# Patient Record
Sex: FEMALE | Race: BLACK OR AFRICAN AMERICAN | Employment: OTHER | URBAN - METROPOLITAN AREA
[De-identification: names, ages, dates, MRNs, and addresses within clinical notes are randomized per-mention and may not be internally consistent; named-entity substitution may affect disease eponyms.]

---

## 2018-12-18 ENCOUNTER — HOSPITAL ENCOUNTER (OUTPATIENT)
Dept: CARDIAC CATH/INVASIVE PROCEDURES | Age: 73
Discharge: HOME | End: 2018-12-18
Attending: INTERNAL MEDICINE | Admitting: INTERNAL MEDICINE
Payer: MEDICARE

## 2018-12-18 VITALS
OXYGEN SATURATION: 100 % | BODY MASS INDEX: 16.38 KG/M2 | HEIGHT: 62 IN | HEART RATE: 77 BPM | RESPIRATION RATE: 18 BRPM | DIASTOLIC BLOOD PRESSURE: 71 MMHG | SYSTOLIC BLOOD PRESSURE: 166 MMHG | WEIGHT: 89 LBS

## 2018-12-18 PROCEDURE — 77030013406 HC CATH CTRL EDWD -B

## 2018-12-18 PROCEDURE — C1751 CATH, INF, PER/CENT/MIDLINE: HCPCS

## 2018-12-18 PROCEDURE — 77030013744

## 2018-12-18 PROCEDURE — 74011250636 HC RX REV CODE- 250/636: Performed by: INTERNAL MEDICINE

## 2018-12-18 PROCEDURE — 93463 DRUG ADMIN & HEMODYNMIC MEAS: CPT

## 2018-12-18 PROCEDURE — C1769 GUIDE WIRE: HCPCS

## 2018-12-18 PROCEDURE — 99152 MOD SED SAME PHYS/QHP 5/>YRS: CPT

## 2018-12-18 RX ORDER — ACETAMINOPHEN/DIPHENHYDRAMINE 500MG-25MG
1 TABLET ORAL
COMMUNITY

## 2018-12-18 RX ORDER — SODIUM CHLORIDE 0.9 % (FLUSH) 0.9 %
5-10 SYRINGE (ML) INJECTION AS NEEDED
Status: DISCONTINUED | OUTPATIENT
Start: 2018-12-18 | End: 2018-12-18

## 2018-12-18 RX ORDER — ASPIRIN 81 MG/1
81 TABLET ORAL DAILY
COMMUNITY

## 2018-12-18 RX ORDER — FENTANYL CITRATE 50 UG/ML
25-200 INJECTION, SOLUTION INTRAMUSCULAR; INTRAVENOUS
Status: DISCONTINUED | OUTPATIENT
Start: 2018-12-18 | End: 2018-12-18

## 2018-12-18 RX ORDER — FENOFIBRATE 48 MG/1
48 TABLET, COATED ORAL DAILY
COMMUNITY

## 2018-12-18 RX ORDER — MIDAZOLAM HYDROCHLORIDE 1 MG/ML
.5-1 INJECTION, SOLUTION INTRAMUSCULAR; INTRAVENOUS
Status: DISCONTINUED | OUTPATIENT
Start: 2018-12-18 | End: 2018-12-18

## 2018-12-18 RX ORDER — ATROPINE SULFATE 0.1 MG/ML
.5-1 INJECTION INTRAVENOUS AS NEEDED
Status: DISCONTINUED | OUTPATIENT
Start: 2018-12-18 | End: 2018-12-18

## 2018-12-18 RX ORDER — HEPARIN SODIUM 200 [USP'U]/100ML
2000 INJECTION, SOLUTION INTRAVENOUS AS NEEDED
Status: DISCONTINUED | OUTPATIENT
Start: 2018-12-18 | End: 2018-12-18

## 2018-12-18 RX ORDER — LORATADINE 10 MG/1
10 TABLET ORAL DAILY
COMMUNITY

## 2018-12-18 RX ORDER — ROSUVASTATIN CALCIUM 10 MG/1
10 TABLET, COATED ORAL
COMMUNITY

## 2018-12-18 RX ORDER — LIDOCAINE HYDROCHLORIDE 10 MG/ML
10-30 INJECTION INFILTRATION; PERINEURAL
Status: DISCONTINUED | OUTPATIENT
Start: 2018-12-18 | End: 2018-12-18

## 2018-12-18 RX ORDER — POTASSIUM CHLORIDE 750 MG/1
20 TABLET, EXTENDED RELEASE ORAL DAILY
COMMUNITY

## 2018-12-18 RX ORDER — METOPROLOL TARTRATE 50 MG/1
50 TABLET ORAL 2 TIMES DAILY
COMMUNITY

## 2018-12-18 RX ORDER — BUMETANIDE 2 MG/1
2 TABLET ORAL 2 TIMES DAILY
COMMUNITY

## 2018-12-18 RX ORDER — SODIUM CHLORIDE 0.9 % (FLUSH) 0.9 %
5-10 SYRINGE (ML) INJECTION EVERY 8 HOURS
Status: DISCONTINUED | OUTPATIENT
Start: 2018-12-18 | End: 2018-12-18 | Stop reason: HOSPADM

## 2018-12-18 RX ORDER — SODIUM CHLORIDE 9 MG/ML
25-100 INJECTION, SOLUTION INTRAVENOUS CONTINUOUS
Status: DISCONTINUED | OUTPATIENT
Start: 2018-12-18 | End: 2018-12-18 | Stop reason: HOSPADM

## 2018-12-18 RX ORDER — ISOSORBIDE DINITRATE 30 MG/1
30 TABLET ORAL DAILY
COMMUNITY

## 2018-12-18 RX ORDER — METOLAZONE 2.5 MG/1
2.5 TABLET ORAL EVERY OTHER DAY
COMMUNITY

## 2018-12-18 RX ADMIN — MIDAZOLAM 1 MG: 1 INJECTION INTRAMUSCULAR; INTRAVENOUS at 12:05

## 2018-12-18 RX ADMIN — FENTANYL CITRATE 25 MCG: 50 INJECTION, SOLUTION INTRAMUSCULAR; INTRAVENOUS at 12:05

## 2018-12-18 RX ADMIN — LIDOCAINE HYDROCHLORIDE 3 ML: 10 INJECTION, SOLUTION INFILTRATION; PERINEURAL at 12:22

## 2018-12-18 RX ADMIN — HEPARIN SODIUM 2000 UNITS: 200 INJECTION, SOLUTION INTRAVENOUS at 12:14

## 2018-12-18 RX ADMIN — MIDAZOLAM 1 MG: 1 INJECTION INTRAMUSCULAR; INTRAVENOUS at 12:21

## 2018-12-18 RX ADMIN — SODIUM CHLORIDE 25 ML/HR: 900 INJECTION, SOLUTION INTRAVENOUS at 11:58

## 2018-12-18 RX ADMIN — MIDAZOLAM 1 MG: 1 INJECTION INTRAMUSCULAR; INTRAVENOUS at 12:22

## 2018-12-18 NOTE — PROGRESS NOTES
1157  Cardiac Cath Lab Procedure Area Arrival Note:    Neal Jewell arrived to Cardiac Cath Lab, Procedure Area. Patient identifiers verified with NAME and DATE OF BIRTH. Procedure verified with patient. Consent forms verified. Allergies verified. Patient informed of procedure and plan of care. Questions answered with review. Patient voiced understanding of procedure and plan of care. Patient on cardiac monitor, non-invasive blood pressure, SPO2 monitor. On O2 @ 3 lpm via NC.  IV of NS on pump at 25 ml/hr. Patient status doing well without problems. Patient is A&Ox 4. Patient reports no pain. Patient medicated during procedure with orders obtained and verified by Dr. Yuli Sherman. Refer to patients Cardiac Cath Lab PROCEDURE REPORT for vital signs, assessment, status, and response during procedure, printed at end of case. Printed report on chart or scanned into chart. 1315:   TRANSFER - OUT REPORT:    Verbal report given to LUIS Wang RN(name) on Neal Jewell  being transferred to (unit) for routine post - op       Report consisted of patients Situation, Background, Assessment and   Recommendations(SBAR). Information from the following report(s) SBAR, Procedure Summary and MAR was reviewed with the receiving nurse. Lines:   Peripheral IV 12/18/18 Right (Active)   Site Assessment Clean, dry, & intact 12/18/2018 11:58 AM   Phlebitis Assessment 0 12/18/2018 11:58 AM   Infiltration Assessment 0 12/18/2018 11:58 AM   Dressing Status Clean, dry, & intact 12/18/2018 11:58 AM   Dressing Type Transparent 12/18/2018 11:58 AM   Hub Color/Line Status Blue 12/18/2018 11:58 AM   Alcohol Cap Used Yes 12/18/2018 11:58 AM        Opportunity for questions and clarification was provided.       Patient transported with:   Registered Nurse

## 2018-12-18 NOTE — H&P
History and Physical as documented in office note from 2018  No interval changes    MD Szuy Zaragoza  2018 7:43 AM  Location: [address] [place] [address] Office  Patient #: [id number]  : 1945   / Language: English / Race: [place] or African American  Female      History of Present Illness (Isabela Mckeon; 2018 5:20 PM)  The patient is a 74year old female who presents with pulmonary hypertension. The patient presents for initial presentation of symptoms. Symptoms include dyspnea on exertion. Onset was gradual month(s) ago. The patient describes this as mild and unchanged. Symptoms are exacerbated by exertion. Associated symptoms do not include fever. By report there is good compliance with treatment. Additional reasons for visit:    Transition into care is described as the following: The patient is transitioning into care from another physician. Note: Patient is here for evaluation of pulmonary hypertension. She has a history of long-standing COPD with FEV1 measured at 0.72 L, on home oxygen long-term who has had trouble with exertional breathlessness. Echocardiogram was done during one of her hospitalizations and showed markedly elevated right-sided pressures with severe dilatation of right ventricle and dysfunction. Initial workup has been initiated with serologies pending. Lung VQ scan is pending as well. She reports functional limitation and has been found to be at [facility] functional class III. She underwent cardiac catheterization and coronary angiograms in the past that found severe coronary artery disease that is now medically managed. LVEDP was within normal range during cardiac catheterization studies judged to be between 8 and 10 mmHg. Patient may benefit from right heart catheterization for confirmation of severe precapilalry pulmonary hypertension.  If filling pressures are again within normal range, perhaps cautious trial of off label pulmonary vasodilator therapy could be considered for out of proportion precapillary pulmonary hypertension not explained by COPD. She appears to be compliant with continuous oxygen and this has been advised. Diagnosis of pulmonary hypertension, requirements and need for further workup was discussed with her and her  today. Right heart catheterization will be scheduled at [address] in the near future    Problem List/Past Medical Karsten Mary Carmenbryant; 11/29/2018 3:11 PM)  DEPENDENCE ON OXYGEN (V46.2)    PNEUMONIA (J18.9)    RHINITIS, NONALLERGIC (J31.0)    DYSPNEA (R06.00)    GRANULOMATOUS DISEASE, CHRONIC (D71)   h/o histoplasmosis. HYPOXIA (R09.02)    ABNORMAL LUNG SOUNDS (R09.89)    LOW DOSE CT SCAN ORDER WITH HISTORY OF TOBACCO USE Z87.891 (Z87.891)    FLU VACCINE RECEIVED PRIOR TO TODAY'S VISIT (Z92.29)    CHRONIC RESPIRATORY FAILURE WITH HYPOXIA (J96.11)    SEVERE CHRONIC OBSTRUCTIVE PULMONARY DISEASE (J44.9)   FEV1 0.72L (47%)  PULMONARY HYPERTENSION (I27.20)   Severely decompensated RV failure without left sided issues. Does have COPD but severity oh PH seems to be out of proportion to that.  -obtain CTD labs, VQ scan, HIV  -referal to Dr. Nicanor Andre for further eval with [facility]  TOBACCO USE (Z72.0)   Lifelong smoker- currently 1/2 ppd. CARDIOMYOPATHY (I42.9)   dHF and cor pulmonale  EXERCISE-INDUCED SHORTNESS OF BREATH (R06.02)    ENVIRONMENTAL ALLERGIES (Z91.09)    PHYSICAL DECONDITIONING (R53.81)    CORONARY ARTERY DISEASE (I25.10)   medically managed    Other Problems Karsten Izaiah; 11/29/2018 3:11 PM)  COPD   .  Pneumonia   .  Bronchitis   .  High blood pressure   .  Kidney Disease   . No Advanced Medical Directive   .  Unspecified Diagnosis    Unspecified Diagnosis      Allergies (Nitza Bliss; 11/29/2018 3:11 PM)  No Known Drug Allergies  [02/16/2016]: Family History Nitza lBiss; 11/29/2018 3:11 PM)  Hypertension   Mother. .  Arthritis   Mother. .  Heart Disease   Mother.  .    Social History Nitza Bliss; 11/29/2018 3:11 PM)  No alcohol use   .  Exercise   Inactive. .  Tobacco use   Current every day smoker. Smokes < 1 pack of cigarettes per day . No drug use   .  Pets/Animals in home   None. Medication History Keyla Barnhart; 11/29/2018 3:11 PM)  ProAir HFA  (108 (90 Base)MCG/ACT Aerosol Soln, 2 (two) Inhalation every four hours as needed, Taken starting 11/16/2018) Active. Spiriva Respimat  (2.5MCG/ACT Aerosol Soln, 2 (two) Inhalation daily, Taken starting 11/16/2018) Active. Fluticasone Propionate  (50MCG/ACT Suspension, 2 (two) Nasal daily, Taken starting 10/15/2018) Active. Dulera  (200-5MCG/ACT Aerosol, 2 (two) Inhalation two times daily, Taken starting 11/16/2018) Active. Mucinex  (600MG Tablet ER 12HR, 2 (two) Oral two times daily, Taken starting 10/15/2018) Active. (generic is fine)  Albuterol Sulfate  ((2.5 MG/3ML)0.083% Nebulized Soln, 1 (one) Inhalation every four hours, as needed, Taken starting 11/16/2018) Active. (please run under medicare part B)  Loratadine  (10MG Capsule, 1 (one) Oral daily, Taken starting 10/15/2018) Active. Aspirin  (81MG Tablet, 1 Oral daily) Active. Imdur  (30MG Tablet ER 24HR, 1 Oral daily) Active. MetOLazone  (2.5MG Tablet, 1 Oral daily) Active. Potassium Chloride  [facility] Tablet ER, 2 Oral daily) Active. Metoprolol Tartrate  (25MG Tablet, 1 Oral two times daily) Active. Fenofibrate  (48MG Tablet, 1 Oral daily) Active. Medications Reconciled     Past Surgical History Keyla Barnhart; 11/29/2018 3:11 PM)  Hysterectomy   .        Review of Systems (Joey Cook; 11/29/2018 3:01 PM)  General Not Present- Fever, Food allergies, Medication allergies, Night Sweats, Seasonal allergies, Weight Gain and Weight Loss. Skin Not Present- Cyanosis, Jaundice and Rash. HEENT Present- Sinusitis.  Not Present- Blurred Vision, Deafness, Difficulty swallowing, Double Vision, Ear Discharge, Ear Pain, Eye discharge, Hoarseness, Nasal obstruction, Nose Bleed, Ringing in the Ears, Sore Throat and Wears glasses/contact lenses. Respiratory Present- Daytime sleepiness and Shortness of breath. Not Present- Cough, Coughing blood, Snoring and Wheezing. Cardiovascular Not Present- Chest Pain and Palpitations. Gastrointestinal Present- Constipation. Not Present- Abdominal Pain, Diarrhea, Indigestion, Nausea, Rectal Bleeding and Vomiting. Female Genitourinary Not Present- Blood in Urine, Frequency, Incontinence, Painful Urination and Urinating at Night. Musculoskeletal Present- Swelling of Extremities. Not Present- Joint Pain, Joint Stiffness and Joint Swelling. Neurological Present- Headaches. Not Present- Numbness and Weakness. Psychiatric Not Present- Anxiety, Depression and Hallucinations. Endocrine Present- Excessive Thirst. Not Present- Cold Intolerance and Heat Intolerance. Hematology Not Present- Abnormal Bleeding, Easy Bruising and Swollen glands. Vitals Alexander Carrillo; 11/29/2018 3:13 PM)  11/29/2018 3:11 PM  Weight: 91 lb   Height: 62 in   Body Surface Area: 1.37 m²   Body Mass Index: 16.64 kg/m²    Temp.: 98.8° F    Pulse: 78 (Regular)    P. OX: 99% (Room air)  BP: 130/72 (Sitting, Left Arm, Standard)              Physical Exam (March Burow; 12/1/2018 5:19 PM)  Chest and Lung Exam  Constitutional - alert, cooperative, well nourished and no acute distress. Skin - normal moisture and normal warmth. Head and Neck - normocephalic, atraumatic, trachea midline and thyroid normal.  Eyes - pupils equal round and reactive to light, conjunctiva normal and lids normal.  ENMT - external ears have normal appearance, external nose is normal, oral mucosa is moist and oropharynx clear. Chest and Lungs  Auscultation - breath sounds normal, no wheezes, no rhonchi and no rales. Palpation - chest wall non-tender. Inspection - normal shape and normal respiratory effort. Cardiovascular  Auscultation - rate regular and systolic ejection murmur at left sternal border.  Inspection - jugular veins nondistended and no lower extremity edema. Abdomen - normal contour, nontender, soft and no hepatosplenomegaly. Peripheral Vascular - normal gait and no digital clubbing. Neuropsychiatric - oriented x 3, fluent, appropriate mood and affect and good insight. Lymphatic - no neck lymphadenopathy and no supraclavicular lymphadenopathy. Neurological - extraocular muscles intact. Assessment & Plan (Ardis Lennox; 11/29/2018 3:40 PM)  PULMONARY HYPERTENSION (I27.20) <HCC85>  Story: Severely decompensated RV failure without left sided issues. Does have COPD but severity oh PH seems to be out of proportion to that.  -obtain CTD labs, VQ scan, HIV  -referal to Dr. Bob Baumgarten for further eval with [address]  Current Plans  Pt Education - How to access health information online: discussed with patient and provided information. Thank you for your referral. Please see my notes for my impression and treatment plans. I have instructed the patient to follow-up with you as requested. Pt Education - Eating Better - Dietary Guidance on Salt and Fluid Restrictions: discussed with patient and provided information. Right Heart Cath: Instructions  The test will be performed in the Cath Lab at [facility]: [facility], [address]  Please review all of the instructions about your procedure as soon as you get them, so you can prepare. You may need to change some of your medicines  Arrange to have an adult to drive you to your procedure and be there to take you home after the procedure. If you are taking a cab, bus or medical transportation service home after the procedure, an adult, other than the , needs to ride with you for your safety  Report to the hospital for admission 90 mins before your procedure time to allow time for preparation for this procedure  Start clear liquids at midnight before procedure. You may eat your regular meals until midnight before you procedure.  From midnight, drink only clear liquids until 3 hours before your test.  * These clear liquids are allowed:  Water.  Fruit juices that you can see through, such as apple, white cranberry or white grape.  Popsicles or ice chips.  Ginger ale or lemon-lime soda.  Jell-o.  Coffee or tea (no milk or cream added). *If your procedure is scheduled after 10 AM, you can eat a light breakfast before 6 AM of a piece of toast or a small bowl of cereal with milk. After that, only clear liquids until 3 hours before your procedure  Morning of your procedure   Take your medicines for blood pressure, heart issues, seizures or pain with a sip of water up to 2 hours before your procedure as directed.   Female patients should come prepared to give a urine sample in case a pregnancy test is needed.                Your  must be present when you sign in to proceed with your test.      Bring these with you to your test:                All of your medicines in their prescription bottles that you are scheduled to take during the day.   A list of all medicines, including prescription and over the counter medicines and any vitamin or herbal products you are taking.   A list of your allergies. List of medical conditions and previous surgeries.                                A copy of advanced directive, such as a Living Will or [address]. Your photo identification, insurance card and co-payment, if needed.   CPAP machine if you use one at home for sleep apnea.   Overnight bag packed with personal items and toiletries, in case you need to stay at the hospital. Leave this in the car until you know if you will need to stay. If you have any questions or need to cancel or change your appointment, call [facility] at [phone] and [facility] cath lab at [phone]    Pt Education - Right Heart Catherization information about the procedure: discussed with patient and provided information.   Pt Education - What is Pulmonary Hypertension? : discussed with patient and provided information. SIMPLE CARDIO PULMONARY STRESS TEST (ATS), TO BE PERFORMED BY PFT TECH (37658)  CATHETERIZATION OF RIGHT HEART: RIGHT HEART CATH (86865) ([facility]: try to schedule on 12/18/2018)  Follow up in 4 weeks or as needed  TOBACCO USE (Z72.0)  Story: Lifelong smoker- currently 1/2 ppd.   CHRONIC RESPIRATORY FAILURE WITH HYPOXIA (J96.11) <HCC84>  CORONARY ARTERY DISEASE (I25.10)  Story: medically managed  CARDIOMYOPATHY (I42.9) <HCC85>  Story: dHF and cor pulmonale  NICOTINE DEPENDENCE WITH OTHER NICOTINE-INDUCED DISORDER (F17.208)  Current Plans  Pt Education - Smoking: ways to quit  EDUCATION ABOUT AVOIDANCE OF SMOKING ()  ASSESSMENT FOR TOBACCO USE (1000F)  Pt Education - Smoking: ways to quit  Note: You have pulmonary hypertension of Group 2 and 3 category  We will need a heart catheterization to see if we can safely use medicines to treat this  Try to quit smoking by using the nicotine patch 14 mg daily (OTC)    Medical Decision Making (Pro[employer] Interactive Manual; 12/1/2018 5:23 PM)  Amount/complexity of data to be reviewed:  - Order and/or review of radiology test(s)  - Order and/or review of diagnostic test(s)  - Independent visualization of radiology test  - Review and summarization of old records  - Discussion of case with another health care provider      Immunization Record Kaitlynn Aram; 11/29/2018 3:11 PM)      Immunization Type  Immunization Order  Date  Medication  Funding  Comment  Lisy FlexMinder (3 years and up)  Lisy FlexMinder (3 years and up) ([phone]            Influenza (3 years and up)  Lisy FlexMinder (3 years and up) [phone]            Influenza (3 years and up)  Lisy FlexMinder (3 years and up) [phone]               Signed by [employer] Interactive Manual (12/1/2018 5:23 PM)

## 2018-12-18 NOTE — PROGRESS NOTES
TRANSFER - IN REPORT:    Verbal report received from [facility] on Ishmael James  being received from cath lab procedure area  for routine progression of care. Report consisted of patients Situation, Background, Assessment and Recommendations(SBAR). Information from the following report(s) Kardex and Procedure Summary was reviewed with the receiving clinician. Opportunity for questions and clarification was provided. Assessment completed upon patients arrival to [address] [place] [address] and care assumed. Cardiac Cath Lab Recovery Arrival Note:    Ishmael James arrived to [facility] recovery area. Patient procedure= RHC. Patient on cardiac monitor, non-invasive blood pressure, SPO2 monitor. On O2 @ 3 lpm via NC`1. IV  of NS on pump at 10 ml/hr. Patient status doing well without problems. Patient is A&Ox 3. Patient reports no pain. PROCEDURE SITE CHECK:    Procedure site:without any bleeding and no hematoma, No pain/discomfort reported at procedure site. No change in patient status. Continue to monitor patient and status.

## 2018-12-18 NOTE — ROUTINE PROCESS
Cardiac Cath Lab Recovery Arrival Note:      Quintin Claude arrived to Cardiac Cath Lab, Recovery Area. Staff introduced to patient. Patient identifiers verified with NAME and DATE OF BIRTH. Procedure verified with patient. Consent forms reviewed and signed by patient or authorized representative and verified. Allergies verified. Patient and family oriented to department. Patient and family informed of procedure and plan of care. Questions answered with review. Patient prepped for procedure, per orders from physician, prior to arrival.    Patient on cardiac monitor, non-invasive blood pressure, SPO2 monitor. On O2 @3 lpm via NC. Patient is A&Ox 4. Patient reports no pain. Patient in stretcher, in low position, with side rails up, call bell within reach, patient instructed to call if assistance as needed. Patient prep in: [address], [place] [address]. Patient family has pager #   Family in: . Prep by: SOLOMON Willoughby RN

## 2018-12-18 NOTE — PROCEDURES
Name: Brianne Castro   : 1945   MRN: [id number]   Date: 2018        Date of Procedure:  2018  Pre-procedure Diagnosis: Pulmonary hypertension   Post-procedure Diagnosis: Severe precapillary pulmonary hypertension  Procedure: Right Heart Cath, thermodilution cardiac output, conscious sedation, acute vasoreactivity test using inhaled nitric oxide  : Dr. Sirisha Neville MD   Assistant(s): None   Anesthesia: Local: 1% lidocaine to skin and subcutaneous tissue  Sedation: Moderate  Estimated Blood Loss: Less than 10 mL   Specimens Removed: None   Findings: Severe precapillary pulmonary hypertension, reduced cardiac output, negative acute vasoreactivity test  Complications: None

## 2019-01-15 NOTE — PROCEDURES
[facility] CARDIAC CATH Jessa Zavala 
MR#: [id number] : 1945 ACCOUNT #: [de-identified] DATE OF SERVICE: 2018 STUDY NUMBER:  [id number] PREOPERATIVE DIAGNOSIS: Pulmonary hypertension POSTOPERATIVE DIAGNOSIS:  Pulmonary hypertension PROCEDURE PERFORMED: 
1. Right heart catheterization. 2.  Thermodilution cardiac output. 3.  Conscious sedation. 4.  Acute vasoreactivity testing using inhaled nitric oxide. SURGEON:  Jasbir Aranda MD 
 
ANESTHESIA: Moderate sedation SPECIMENS REMOVED: none TECHNIQUE:  Seldinger. MEDICATIONS USED:   
1.  IV fentanyl 25 mcg. 2.  Versed IV, 2 mg. 3.  Lidocaine 1% without epinephrine 5 mL. INDICATION:  This is a 78-year-old female who has underlying history of COPD and chronic hypoxic respiratory failure who has had worsening exertional breathlessness. Echocardiogram that was done previously had suggested presence of severe pulmonary hypertension with right ventricular dysfunction. Patient also has underlying history of coronary artery disease and had recently undergone a left heart catheterization that did not show need for intervention. She was recommended right heart catheterization for further evaluation of hemodynamics and to assess candidacy for pulmonary vasodilator therapy. DESCRIPTION OF PROCEDURE:  Patient was brought to the cath lab after informed consent was obtained and draped and prepped in the usual fashion. The right internal jugular area was anesthetized and venous access obtained under ultrasound guidance. A 6-Lithuanian x 10 cm sheath was inserted using Seldinger technique and a 6-Lithuanian x 110 cm [place]-Lucille catheter floated under fluoroscopy guidance and hemodynamic data obtained, which are as follows: 1. Pulmonary artery pressure 76 x 20 mmHg. 2.  Mean pulmonary artery pressure of 42 mmHg. 3.  Pulmonary artery wedge pressure of 15 mmHg. 4.  Thermodilution cardiac output of 3.5 liters per minute. 5.  Calculated cardiac index of 2.59 liters per minute per meter squared. 6.  Calculated pulmonary vascular resistance of 7.71 Wood units. Acute vasoreactivity testing was performed using inhaled nitric oxide per protocol. Patient tolerated vasoreactivity testing well and remained hemodynamically stable throughout procedure and had no side effect. Hemodynamic data obtained after inhaled nitric oxide challenge was as follows. 1.  Pulmonary artery pressure 77 x 27 mmHg. 2.  Mean pulmonary artery pressure of 45 mmHg. 3.  Thermodilution cardiac output of 3.63 liters per minute. 4.  Calculated cardiac index of 2.69 liters per minute per meter squared. 5.  Right ventricular pressure of 82 x 7 x 15 mmHg. 6.  Right atrial pressure of 14 mmHg. CONCLUSIONS: 
1. Severe precapillary pulmonary hypertension. 2.  Reduced cardiac output. 3.  Normal filling pressures. 4.  Elevated pulmonary vascular resistance. 5.  Negative acute vasoreactivity testing. COMPLICATIONS:  None. ESTIMATED BLOOD LOSS:  10 mL. MD Soniya Sequeira / [place] 
D: 01/14/2019 20:06    
T: 01/14/2019 20:26 
JOB #: [id number] CC: FABIOLA NEUMANN MD 
CC: Eleno St MD

## 2019-01-15 NOTE — PROCEDURES
[address] CARDIAC CATH Zhen Irizarry 
MR#: [id number] : 1945 ACCOUNT #: [de-identified] DATE OF SERVICE: 2018 PREOPERATIVE DIAGNOSIS:  *** POSTOPERATIVE DIAGNOSIS:  *** PROCEDURES PERFORMED:  Right heart catheterization, conscious sedation, thermodilution cardiac output, acute vasoreactivity testing. SURGEON:  *** ASSISTANT:  *** 
 
ANESTHESIA:  *** SPECIMENS REMOVED:  *** IMPLANTS:  *** INDICATIONS:  Patient is a 20-year-old female with history of COPD and hypoxic respiratory failure who has had worsening exertional breathlessness. An echocardiogram had suggested severe pulmonary hypertension. She has undergone recent left heart catheterization with findings of coronary artery disease but without need for intervention. She was recommended right heart catheterization for further evaluation of pulmonary hypertension for which she presented to the cath lab today. DESCRIPTION OF PROCEDURE:  Patient was brought to the procedure area, draped and prepped in a sterile fashion per usual protocol. Conscious sedation was initiated. Right internal jugular area was prepped and local anesthesia given. Under ultrasound guidance, right internal jugular area was draped prepped and local anesthesia given. Under ultrasound guidance, micropuncture Terumo [place] position access system was used and sheath inserted. A 1 10 cm [place]-Lucille catheter was floated under fluoroscopic guidance and hemodynamic parameters obtained. Hemodynamic data are as follows: 1. Pulmonary artery pressure 76/20 mmHg. 2.  Mean pulmonary artery pressure of 42 mmHg. 3.  Pulmonary artery wedge pressure of 15 mmHg. 4.  Thermodilution cardiac output of 3.5 liters per minute. 5.  Calculated cardiac index of 2.59 liters per minute per meter square. 6.  Calculated pulmonary vascular resistance of 7.71 Wood units. 7.  Acute vasoreactivity testing was performed using inhaled nitric oxide. The patient tolerated vasoreactivity testing without side effects. Hemodynamic data obtained after acute vasoreactivity testing was as follows. 1.  Pulmonary artery pressure of 77/27 mmHg. 2.  Mean pulmonary artery pressure of 45 mmHg. 3.  Thermodilution cardiac output of 3.63 liters per minute. 4.  Calculated cardiac index 2.69 liters per minute per meter square. 5.  Vasoreactivity testing was considered negative, there was no acute vasodilator response. IMPRESSION:   
1. Severe precapillary pulmonary hypertension. 2.  Normal filling pressures. 3.  Reduced cardiac output. 4.  Negative acute vasoreactivity testing. TECHNIQUE:  Seldinger. ESTIMATED BLOOD LOSS:  10 mL. COMPLICATIONS:  None. MD Ildefonso Elena [place] / [place] 
D: 01/14/2019 19:55    
T: 01/14/2019 20:21 
JOB #: [id number] CC: Jackie Silva MD

## 2019-02-15 NOTE — PROCEDURES
[address] CATH    Name:  Dominick Wright  MR#:  [id number]  :  1975  ACCOUNT #:  [de-identified]  DATE OF SERVICE:  2018    Amended document      PROCEDURES PERFORMED:  Right heart catheterization, conscious sedation,  thermodilution cardiac output, acute vasoreactivity testing. SEDATION: Moderate    ANESTHESIA: Local: 1% lidocaine    SPECIMENS REMOVED: None     IMPLANTS:  None    INDICATIONS:  Patient is a 75-year-old female with history of COPD and hypoxic  respiratory failure who has had worsening exertional breathlessness. An  echocardiogram had suggested severe pulmonary hypertension. She has undergone recent  left heart catheterization with findings of coronary artery disease but without need  for intervention. She was recommended right heart catheterization for further  evaluation of pulmonary hypertension for which she presented to the cath lab today. DESCRIPTION OF PROCEDURE:  Patient was brought to the procedure area, draped and  prepped in a sterile fashion per usual protocol. Conscious sedation was initiated. Right internal jugular area was prepped and local anesthesia given. Under ultrasound  guidance, right internal jugular area was draped prepped and local anesthesia given. Under ultrasound guidance, micropuncture Terum[employer] Clay position access system was  used and sheath inserted. A 1 10 cm [place] catheter was floated under  fluoroscopic guidance and hemodynamic parameters obtained. Hemodynamic data are as  follows:    1. Pulmonary artery pressure 76/20 mmHg. 2.  Mean pulmonary artery pressure of 42 mmHg. 3.  Pulmonary artery wedge pressure of 15 mmHg. 4.  Thermodilution cardiac output of 3.5 liters per minute. 5.  Calculated cardiac index of 2.59 liters per minute per meter square. 6.  Calculated pulmonary vascular resistance of 7.71 Wood units. 7.  Acute vasoreactivity testing was performed using inhaled nitric oxide. The  patient tolerated vasoreactivity testing without side effects. Hemodynamic data obtained after acute vasoreactivity testing was as follows. 1.  Pulmonary artery pressure of 77/27 mmHg. 2.  Mean pulmonary artery pressure of 45 mmHg. 3.  Thermodilution cardiac output of 3.63 liters per minute. 4.  Calculated cardiac index 2.69 liters per minute per meter square. 5.  Vasoreactivity testing was considered negative, there was no acute vasodilator  response. IMPRESSION:  1. Severe precapillary pulmonary hypertension. 2.  Normal filling pressures. 3.  Reduced cardiac output. 4.  Negative acute vasoreactivity testing. TECHNIQUE:  Seldinger. ESTIMATED BLOOD LOSS:  10 mL. COMPLICATIONS:  None.       MD VANESSA Sargent / [place]  D: 01/14/2019 19:55     T: 01/14/2019 20:21  JOB #: [id number]  CC: Marivel Dickinson MD